# Patient Record
Sex: FEMALE | Race: AMERICAN INDIAN OR ALASKA NATIVE | HISPANIC OR LATINO
[De-identification: names, ages, dates, MRNs, and addresses within clinical notes are randomized per-mention and may not be internally consistent; named-entity substitution may affect disease eponyms.]

---

## 2023-04-26 ENCOUNTER — NON-APPOINTMENT (OUTPATIENT)
Age: 48
End: 2023-04-26

## 2023-04-26 PROBLEM — Z00.00 ENCOUNTER FOR PREVENTIVE HEALTH EXAMINATION: Status: ACTIVE | Noted: 2023-04-26

## 2023-05-03 ENCOUNTER — NON-APPOINTMENT (OUTPATIENT)
Age: 48
End: 2023-05-03

## 2023-05-11 ENCOUNTER — APPOINTMENT (OUTPATIENT)
Dept: BREAST CENTER | Facility: CLINIC | Age: 48
End: 2023-05-11
Payer: MEDICAID

## 2023-05-11 VITALS
SYSTOLIC BLOOD PRESSURE: 156 MMHG | HEART RATE: 89 BPM | HEIGHT: 69 IN | OXYGEN SATURATION: 100 % | DIASTOLIC BLOOD PRESSURE: 91 MMHG | WEIGHT: 189 LBS | BODY MASS INDEX: 27.99 KG/M2

## 2023-05-11 DIAGNOSIS — R92.2 INCONCLUSIVE MAMMOGRAM: ICD-10-CM

## 2023-05-11 DIAGNOSIS — Z83.3 FAMILY HISTORY OF DIABETES MELLITUS: ICD-10-CM

## 2023-05-11 DIAGNOSIS — Z81.8 FAMILY HISTORY OF OTHER MENTAL AND BEHAVIORAL DISORDERS: ICD-10-CM

## 2023-05-11 DIAGNOSIS — Z80.3 FAMILY HISTORY OF MALIGNANT NEOPLASM OF BREAST: ICD-10-CM

## 2023-05-11 DIAGNOSIS — Z83.49 FAMILY HISTORY OF OTHER ENDOCRINE, NUTRITIONAL AND METABOLIC DISEASES: ICD-10-CM

## 2023-05-11 DIAGNOSIS — Z82.49 FAMILY HISTORY OF ISCHEMIC HEART DISEASE AND OTHER DISEASES OF THE CIRCULATORY SYSTEM: ICD-10-CM

## 2023-05-11 DIAGNOSIS — I10 ESSENTIAL (PRIMARY) HYPERTENSION: ICD-10-CM

## 2023-05-11 PROCEDURE — 99204 OFFICE O/P NEW MOD 45 MIN: CPT

## 2023-05-11 RX ORDER — VALSARTAN 40 MG/1
40 TABLET, COATED ORAL
Refills: 0 | Status: ACTIVE | COMMUNITY

## 2023-05-11 NOTE — PHYSICAL EXAM
[Normocephalic] : normocephalic [EOMI] : extra ocular movement intact [Supple] : supple [Examined in the supine and seated position] : examined in the supine and seated position [Symmetrical] : symmetrical [No dominant masses] : no dominant masses in right breast  [No dominant masses] : no dominant masses left breast [No Nipple Retraction] : no left nipple retraction [No Nipple Discharge] : no left nipple discharge [No Axillary Lymphadenopathy] : no left axillary lymphadenopathy [No Rashes] : no rashes

## 2023-05-11 NOTE — PAST MEDICAL HISTORY
[Postmenopausal] : The patient is postmenopausal [Menarche Age ____] : age at menarche was [unfilled] [Menopause Age____] : age at menopause was [unfilled] [Total Preg ___] : G[unfilled] [Full Term ___] : Full Term: [unfilled] [Abortions ___] : Abortions:[unfilled] [History of Hormone Replacement Treatment] : has no history of hormone replacement treatment [FreeTextEntry6] : none [FreeTextEntry5] : none [FreeTextEntry7] : no [FreeTextEntry8] : yes both kids

## 2023-05-11 NOTE — HISTORY OF PRESENT ILLNESS
[FreeTextEntry1] : 49 yo female referred by her gynecologist Dr. Triston Hawk for discussion of possible use of HRT for menopausal symptoms of hot flashes and insomnia. Patient reports she is not interested in taking HRT. Patient has a family history of breast cancer in her maternal grandmother diagnosed at age 45, DOD. Patient has no breast complaints. Denies palpable abnormalities of the breast, no skin changes/dimpling, no nipple discharge bilaterally. Due to the family history of breast cancer at a young age, I recommended genetic counseling for possible genetic testing. The patient is amenable and agrees. I informed the patient that she was cleared to proceed with HRT if she is interested. I did recommend that she not stay on it long term. \par \par DEL lifetime risk is 14.6%. \par

## 2023-05-11 NOTE — DATA REVIEWED
[FreeTextEntry1] : 1/22/23 (R) b/l sMmg: heterogenously dense. No mammographic evidence of malignancy. Return to screening mammography. BI-RADS 1. \par \par 2/16/23 (R) b/l US: No ultrasonographic evidence of malignancy. BI-RADS 1.

## 2023-05-11 NOTE — REASON FOR VISIT
[Consultation] : a consultation visit [Interpreters_IDNumber] : [id number] [Interpreters_FullName] : beatrice [TWNoteComboBox1] : [place]

## 2023-05-18 ENCOUNTER — APPOINTMENT (OUTPATIENT)
Dept: HEMATOLOGY ONCOLOGY | Facility: CLINIC | Age: 48
End: 2023-05-18

## 2023-05-23 NOTE — DISCUSSION/SUMMARY
[FreeTextEntry1] : REASON FOR CONSULT\par Natacha Negron is a 48-year-old female referred by Dr. Maxine Espino for cancer genetic counseling and risk assessment due to a family history of cancer. Ms. David Negron was seen on May 18, 2023 at which time medical and family history was ascertained and a pedigree constructed. This consultation was carried out with the aid of a  #[id number].\par \par RELEVANT MEDICAL HISTORY\par Ms. David Negron is a healthy individual with no reported history of cancer. She has a family history of cancer, see below.\par \par OTHER MEDICAL AND SURGICAL HISTORY:\par •	Medical History: HTN\par •	Surgical History: appendectomy\par \par OB/GYN HISTORY:\par Obstetrical History: \par Age at Menarche: 12\par Menopausal Status: Post-menopausal with LMP at age 48 \par Age at First Live Birth: 15\par Oral Contraceptive Use: No\par Hormone Replacement Therapy: No\par \par CANCER SCREENING HISTORY:  \par Breast: \par •	Mammography: 23- wnl\par •	Sonography: 23- wnl\par •	MRI: No\par •	Biopsies: No\par GYN:\par •	Pelvic Examination: Annual- reportedly wnl\par Colon:\par •	Colonoscopy: No\par Skin:  \par •	FBSE: No\par •	Lesions biopsied/removed: No\par \par SOCIAL HISTORY:\par •	Tobacco-product use: No\par •	Environmental exposures: No \par \par FAMILY HISTORY:\par Maternal ancestry was reported as [place]. Ashkenazi Lutheran ancestry was denied. A detailed family history of cancer was ascertained, see below and scanned chart for pedigree. \par \par According to Ms. David Negron no one in the family has had germline testing for cancer susceptibility. Consanguinity was denied. \par 	\par RISK ASSESSMENT:\par Ms. David Negron’s family history is suggestive of a hereditary cancer syndrome given her maternal grandmother’s history of breast cancer in her late 30s. The patient meets National Comprehensive Cancer Network (NCCN) criteria for genetic testing. We recommended genetic testing for genes associated with breast and gynecological cancer. This test analyzes 19 genes: CÉSAR, BARD1, BRCA1, BRCA2, BRIP1, CDH1, CHEK2, EPCAM, MLH1, MSH2, MSH6, NF1, PALB2, PMS2, PTEN, RAD51C, RAD51D, STK11, and TP53.\par \par The risks, benefits and limitations of genetic testing were discussed with Ms. David Negron. In addition, we discussed the purpose of genetic testing and possible test results (positive, negative, inconclusive) along with associated medical management options and psychosocial implications. Insurance coverage and potential out of pocket costs were also discussed. The Genetic Information Non-discrimination Act (MECHELLE) was reviewed.\par \par It was explained that risk assessment is based upon medical and family history as provided and may change in the future should new information be obtained. \par \par Following our discussion, Ms. David Negron consented to the above-mentioned genetic testing panel. Blood was drawn in our laboratory and sent to [facility] today.\par \par PLAN:\par \par 1.	Blood drawn today will be sent to [facility] for analysis. \par 2.	We will contact Ms. David Negron to schedule a follow-up appointment once the results are available. Results generally return in 2-3 weeks. \par \par For any additional questions please call Cancer Genetics at [phone]. \par \par \par Frieda Montalvo MS, [facility] [place]par Genetic Counselor, Cancer Genetics\par \par \par

## 2023-05-30 ENCOUNTER — NON-APPOINTMENT (OUTPATIENT)
Age: 48
End: 2023-05-30

## 2023-05-31 NOTE — DISCUSSION/SUMMARY
[FreeTextEntry1] : REASON FOR CONSULT\par Natacha Negron is a 48-year-old female who was contacted on May 30, 2023 for a discussion regarding her negative genetic testing results related to hereditary cancer predisposition. This session was conducted via telephone. This consultation was carried out with the aid of a  #[id number].\par \par Ms. David Negron was originally seen by the Cancer Genetics Service on May 18, 2023 for hereditary cancer predisposition risk assessment due to a family history of cancer. At that time, Ms. David Negron decided to pursue genetic testing for genes associated with breast and gynecological cancers offered by [facility].\par \par TEST RESULTS: NEGATIVE\par NO pathogenic (disease-causing) variants or variants of uncertain significance were detected in any of the following genes [19]:  CÉSAR, BARD1, BRCA1, BRCA2, BRIP1, CDH1, CHEK2, EPCAM, MLH1, MSH2, MSH6, NF1, PALB2, PMS2, PTEN, RAD51C, RAD51D, STK11, and TP53.\par \par RESULTS INTERPRETATION AND ASSESSMENT:\par Given Ms. David Negron’s personal and current reported family history of cancer, and her negative genetic test results, the following screening guidelines and risk-reducing recommendations were discussed:\par \par OTHER:\par •	In the absence of other indications, Ms. David Negron should practice age-appropriate cancer screening of other organ systems as recommended for the general population.\par \par We also discussed the limitations of negative results:\par 1.	The cause of Ms. David Negron’s family history of cancer remains unknown. The cancer(s) may have developed randomly, or due to environmental factors.  \par 2.	This negative result does not completely rule out a hereditary basis for the reported personal and/or family history due to limitations in technology or a variant being present in an unidentified gene. \par 3.	Variants in other genes would not be identified by this analysis, so this negative result does not rule out the likelihood of having a mutation in a different hereditary cancer gene or the possibility of ever developing cancer.\par 4.	It is possible there is a hereditary cancer predisposition gene mutation in the family, but the patient did not inherit it. \par \par We informed Ms. David Negron that our knowledge of genetics and inherited cancer conditions is changing rapidly. Therefore, we recommended that Ms. David Negron contact our office, every 2 to 3 years, to discuss relevant advances in cancer genetics.  We emphasized the importance of re-contacting us with updates regarding her personal and family history of cancer as well as any updates regarding additional cancer genetic test results performed for the patient and/or family members.  Such updates could possibly change our risk assessment and recommendations. \par \par PLAN:\par 1.	These results do not change Ms. David Negron’s medical management. \par 2.	Patient informed consult note(s) will be available through their [facility] patient portal and genetic test results will be released via [facility]’s portal.\par 3.	Ms. David Negron was encouraged to contact us every 2-3 years to discuss relevant advances in cancer genetics, or sooner if there are any changes in her personal or family history of cancer.\par \par \par For any additional questions please call Cancer Genetics at [phone]. \par \par \par Frieda Montalvo MS, [facility] [place]\par Genetic Counselor, Cancer Genetics\par \par

## 2024-01-04 ENCOUNTER — APPOINTMENT (OUTPATIENT)
Dept: ULTRASOUND IMAGING | Facility: CLINIC | Age: 49
End: 2024-01-04

## 2024-01-04 ENCOUNTER — APPOINTMENT (OUTPATIENT)
Dept: MAMMOGRAPHY | Facility: CLINIC | Age: 49
End: 2024-01-04

## 2024-01-04 ENCOUNTER — APPOINTMENT (OUTPATIENT)
Dept: BREAST CENTER | Facility: CLINIC | Age: 49
End: 2024-01-04

## 2024-01-10 ENCOUNTER — NON-APPOINTMENT (OUTPATIENT)
Age: 49
End: 2024-01-10